# Patient Record
Sex: FEMALE | URBAN - METROPOLITAN AREA
[De-identification: names, ages, dates, MRNs, and addresses within clinical notes are randomized per-mention and may not be internally consistent; named-entity substitution may affect disease eponyms.]

---

## 2017-11-29 ENCOUNTER — TRANSFERRED RECORDS (OUTPATIENT)
Dept: HEALTH INFORMATION MANAGEMENT | Facility: CLINIC | Age: 82
End: 2017-11-29

## 2017-11-29 ENCOUNTER — TELEPHONE (OUTPATIENT)
Dept: OPHTHALMOLOGY | Facility: CLINIC | Age: 82
End: 2017-11-29

## 2017-11-29 DIAGNOSIS — H33.21 RIGHT RETINAL DETACHMENT: Primary | ICD-10-CM

## 2017-11-29 NOTE — TELEPHONE ENCOUNTER
Dr Dinah Reed wants pt seen today for an RD. She lives 4 hours away. You can call the clinic at [phone] and/or call the pt with an apt.     Right eye retina detachment-- 20/200 vision   Pt symptomatic-- unsure, but been awhile and worsened and yesterday scared pt per patient-- detachment bubbling over macula, unsure if macula off    Last eye exam 11-20-17 with DR. Reed and no detachment then    H/o HTN, no DM  No previous Retina detachments  Pseudophakic    Will review plan of care with Dr. lucio and call back  Geovanni Cool RN 12:21 PM 11/29/17

## 2017-11-29 NOTE — TELEPHONE ENCOUNTER
----- Message from Marko Diaz sent at 11/29/2017 11:59 AM CST -----  Regarding: same day RD apt  Olamide Galarza, Dr Dinah Reed wants pt seen today for an RD. She lives 4 hours away. You can call the clinic at [phone] and/or call the pt with an apt.    Thanks stephany!    Marko

## 2017-11-29 NOTE — TELEPHONE ENCOUNTER
Reviewed plan with dr. Rawls/Lola    Pt to have pre-op today  See dr. lucio tomorrow AM  Surgery mid morning  Post op Friday  May f/u locally afterwards for post-op   Reviewed plan with pt and updated referring clinic  Notes to be coming  Pt aware of date/time/location  Geovanni Cool RN 12:54 PM 11/29/17    Note to surgery scheduler/Dr. Davila/facilitator  Geovanni Cool RN 12:55 PM 11/29/17

## 2017-11-30 ENCOUNTER — OFFICE VISIT (OUTPATIENT)
Dept: OPHTHALMOLOGY | Facility: CLINIC | Age: 82
End: 2017-11-30
Attending: OPHTHALMOLOGY
Payer: MEDICARE

## 2017-11-30 ENCOUNTER — TRANSFERRED RECORDS (OUTPATIENT)
Dept: HEALTH INFORMATION MANAGEMENT | Facility: CLINIC | Age: 82
End: 2017-11-30

## 2017-11-30 DIAGNOSIS — H33.21 RIGHT RETINAL DETACHMENT: Primary | ICD-10-CM

## 2017-11-30 PROCEDURE — 99213 OFFICE O/P EST LOW 20 MIN: CPT | Mod: ZF

## 2017-11-30 RX ORDER — MAGNESIUM GLUCONATE 27 MG(500)
1000 TABLET ORAL
COMMUNITY

## 2017-11-30 RX ORDER — ASPIRIN 325 MG
325 TABLET ORAL
COMMUNITY

## 2017-11-30 RX ORDER — ACETAMINOPHEN 500 MG
500 TABLET ORAL
COMMUNITY

## 2017-11-30 RX ORDER — AMLODIPINE BESYLATE 5 MG/1
5 TABLET ORAL
COMMUNITY
Start: 2017-06-23

## 2017-11-30 RX ORDER — MULTIVIT-MIN/IRON/FOLIC ACID/K 18-600-40
1 CAPSULE ORAL
COMMUNITY

## 2017-11-30 RX ORDER — VITAMIN E 268 MG
1000 CAPSULE ORAL
COMMUNITY

## 2017-11-30 RX ORDER — ACETAMINOPHEN 160 MG
1000 TABLET,DISINTEGRATING ORAL
COMMUNITY

## 2017-11-30 RX ORDER — VALSARTAN AND HYDROCHLOROTHIAZIDE 80; 12.5 MG/1; MG/1
1 TABLET, FILM COATED ORAL
COMMUNITY
Start: 2017-06-23

## 2017-11-30 ASSESSMENT — CONF VISUAL FIELD
OS_NORMAL: 1
OD_INFERIOR_TEMPORAL_RESTRICTION: 1
METHOD: COUNTING FINGERS
OD_SUPERIOR_TEMPORAL_RESTRICTION: 3
OD_INFERIOR_NASAL_RESTRICTION: 1
OD_SUPERIOR_NASAL_RESTRICTION: 1

## 2017-11-30 ASSESSMENT — REFRACTION_WEARINGRX
OS_ADD: +2.75
OD_SPHERE: -0.75
OS_SPHERE: -1.00
OD_AXIS: 067
SPECS_TYPE: PAL
OD_ADD: +2.75
OS_CYLINDER: +0.50
OS_AXIS: 035
OD_CYLINDER: +0.75

## 2017-11-30 ASSESSMENT — TONOMETRY
IOP_METHOD: TONOPEN
OD_IOP_MMHG: 11
OS_IOP_MMHG: 17

## 2017-11-30 ASSESSMENT — VISUAL ACUITY
CORRECTION_TYPE: GLASSES
METHOD: SNELLEN - LINEAR
OS_CC+: -2
OD_CC: 20/500
OS_CC: 20/20

## 2017-11-30 ASSESSMENT — SLIT LAMP EXAM - LIDS
COMMENTS: NORMAL
COMMENTS: NORMAL

## 2017-11-30 ASSESSMENT — EXTERNAL EXAM - RIGHT EYE: OD_EXAM: NORMAL

## 2017-11-30 ASSESSMENT — EXTERNAL EXAM - LEFT EYE: OS_EXAM: NORMAL

## 2017-11-30 ASSESSMENT — CUP TO DISC RATIO
OD_RATIO: 0.2
OS_RATIO: 0.2

## 2017-11-30 NOTE — MR AVS SNAPSHOT
After Visit Summary   2017    Moon Prakash    MRN: [id number]           Patient Information     Date Of Birth          1935        Visit Information        Provider Department      2017 7:30 AM Hayley Ruiz MD [facility]        Today's Diagnoses     Right retinal detachment    -  1       Follow-ups after your visit        Follow-up notes from your care team     Return in about 1 day (around 2017).      Your next 10 appointments already scheduled     Dec 01, 2017  8:30 AM CST   Post-Op with Hazel Rawls MD   Eye Clinic ([facility])    [address]  [address]  [address]  [facility] [address] [phone]              Who to contact     Please call your clinic at [phone] to:    Ask questions about your health    Make or cancel appointments    Discuss your medicines    Learn about your test results    Speak to your doctor   If you have compliments or concerns about an experience at your clinic, or if you wish to file a complaint, please contact [facility] Patient Relations at [phone] or email us at [email or web address]         Additional Information About Your Visit        MyChart Information     [employer] is an electronic gateway that provides easy, online access to your medical records. With [employer] you can request a clinic appointment, read your test results, renew a prescription or communicate with your care team.     To sign up for [employer] visit the website at [email or web address]   You will be asked to enter the access code listed below, as well as some personal information. Please follow the directions to create your username and password.     Your access code is: [email or web address]  Expires: 2018  6:30 AM     Your access code will  in 90 days. If you need help or a new code, please contact your [facility] or call  [phone] for assistance.        Care EveryWhere ID     This is your Care EveryWhere ID. This could be used by other organizations to access your [facility] medical records  [id number]         Blood Pressure from Last 3 Encounters:   No data found for BP    Weight from Last 3 Encounters:   No data found for Wt              We Performed the Following     Cherrie-Operative Worksheet (Retina)        Primary Care Provider Office Phone # Fax #    Nettie Gonzalez [phone]       [address] [address]        Equal Access to Services     [facility]: Hadii aad ku hadasho Soomaali, waaxda luqadaha, qaybta kaalmada adeegyada, waxay idiin hayaan adam phillips . So [facility] [phone].    ATENCIÓN: Si habla español, tiene a shaffer disposición servicios gratuitos de asistencia lingüística. [facility] [phone].    We comply with applicable federal civil rights laws and [place] laws. We do not discriminate on the basis of race, color, national origin, age, disability, sex, sexual orientation, or gender identity.            Thank you!     Thank you for choosing EYE CLINIC  for your care. Our goal is always to provide you with excellent care. Hearing back from our patients is one way we can continue to improve our services. Please take a few minutes to complete the written survey that you may receive in the mail after your visit with us. Thank you!             Your Updated Medication List - Protect others around you: Learn how to safely use, store and throw away your medicines at www.disposemymeds.org.          This list is accurate as of: 11/30/17  8:47 AM.  Always use your most recent med list.                   Brand Name Dispense Instructions for use Diagnosis    acetaminophen 500 MG tablet    TYLENOL     Take 500 mg by mouth        amLODIPine 5 MG tablet    NORVASC     Take 5 mg by mouth        aspirin 325 MG tablet      Take 325 mg by mouth         coenzyme Q-10 10 MG Caps      Take 1 tablet by mouth        conjugated estrogens cream    PREMARIN     Place 1 g vaginally        FISH OIL + D3 PO      Take 1,000 mg by mouth        flaxseed oil 1000 MG Caps      Take 1,000 mg by mouth        Garlic 1000 MG Caps      Take 1 capsule by mouth        magnesium gluconate 500 MG tablet    MAGONATE     Take 1,000 mg by mouth        Octacosanol 1000-5 MCG-UNIT Tabs      Take 1 tablet by mouth        valsartan-hydrochlorothiazide 80-12.5 MG per tablet    DIOVAN-HCT     Take 1 tablet by mouth        Vitamin B 12 100 MCG Lozg      Take 1 tablet by mouth        Vitamin C 500 MG Caps      Take 1 tablet by mouth        vitamin D3 2000 UNITS Caps      Take 1,000 Units by mouth

## 2017-11-30 NOTE — LETTER
11/30/2017       RE: Moon Prakash  [address]  [place] [address]     Dear Colleague,    Thank you for referring your patient, Moon Prakash, to the EYE CLINIC at [facility]. Please see a copy of my visit note below.       CC: flashes and floaters right eye   HPI: Moon Prakash is a  82 year old year-old patient referred by Dr Dinah Reed, optometrist, for evaluation and treat of a possible Retinal detachment  Right eye. Patient reports 10 days ago noted change in vision in right eye where nasal visual field appeared blurred. She thought this was due to secondary cataract (pco) development, however over thanksgiving weekend nasal field became opaque and this area seemed to be expanding. Patient was seen by outside optometrist diagnosed with retinal detachment. NPO since 11 pm.     Medical history:   High myopia  Pseudophakai both eyes     Family history:   Retinal detachment- mother      Assessment & Plan:     1. Rhematogenous Retinal Detachment right eye   Temporal retinal detachment with hole identified at 8 oclock. Macula off right eye     I reviewed the indications, risks, benefits, and alternatives of the proposed surgical procedure including, but not limited to, failure to improve vision or further loss of vision,  and need for additional surgery, bleeding, infection, loss of vision and the remote possibility of complications of anesthesia. 1:1000 risk of infection/bleed/loss of eye; 1:100 risk of RD and need for further surgery. Patient aware of prolonged healing after retinal surgery (up to a year after surgery) as well as possibility of a gas/SO  instillation into eye. Patient agreed to proceed with surgery.  I provided multiple opportunities for the questions, answered all questions to the best of my ability, and confirmed that my answers and my discussion were understood.     Plan:   Plan for surgical repair today   Return to clinic 1 day for post op  care     Rosana Duenas MD  Ophthalmology PGY3    ~~~~~~~~~~~~~~~~~~~~~~~~~~~~~~~~~~   Complete documentation of historical and exam elements from today's encounter can be found in the full encounter summary report (not reduplicated in this progress note).  I personally obtained the chief complaint(s) and history of present illness.  I confirmed and edited as necessary the review of systems, past medical/surgical history, family history, social history, and examination findings as documented by others; and I examined the patient myself.  I personally reviewed the relevant tests, images, and reports as documented above.  I personally reviewed the ophthalmic test(s) associated with this encounter, agree with the interpretation(s) as documented by the resident/fellow, and have edited the corresponding report(s) as necessary.   I formulated and edited as necessary the assessment and plan and discussed the findings and management plan with the patient and family    Hayley Ruiz MD  .  Retina Service   Department of Ophthalmology and Visual Neurosciences   [facility]  Phone: [phone]   Fax: [phone]

## 2017-11-30 NOTE — PROGRESS NOTES
CC: flashes and floaters right eye   HPI: Moon Prakash is a  82 year old year-old patient referred by Dr Dinah Reed, optometrist, for evaluation and treat of a possible Retinal detachment  Right eye. Patient reports 10 days ago noted change in vision in right eye where nasal visual field appeared blurred. She thought this was due to secondary cataract (pco) development, however over thanksgiving weekend nasal field became opaque and this area seemed to be expanding. Patient was seen by outside optometrist diagnosed with retinal detachment. NPO since 11 pm.     Medical history:   High myopia  Pseudophakai both eyes     Family history:   Retinal detachment- mother      Assessment & Plan:     1. Rhematogenous Retinal Detachment right eye   Temporal retinal detachment with hole identified at 8 oclock. Macula off right eye     I reviewed the indications, risks, benefits, and alternatives of the proposed surgical procedure including, but not limited to, failure to improve vision or further loss of vision,  and need for additional surgery, bleeding, infection, loss of vision and the remote possibility of complications of anesthesia. 1:1000 risk of infection/bleed/loss of eye; 1:100 risk of RD and need for further surgery. Patient aware of prolonged healing after retinal surgery (up to a year after surgery) as well as possibility of a gas/SO  instillation into eye. Patient agreed to proceed with surgery.  I provided multiple opportunities for the questions, answered all questions to the best of my ability, and confirmed that my answers and my discussion were understood.     Plan:   Plan for surgical repair today   Return to clinic 1 day for post op care     Rosana Duenas MD  Ophthalmology PGY3    ~~~~~~~~~~~~~~~~~~~~~~~~~~~~~~~~~~   Complete documentation of historical and exam elements from today's encounter can be found in the full encounter summary report (not reduplicated in this progress note).  I personally  obtained the chief complaint(s) and history of present illness.  I confirmed and edited as necessary the review of systems, past medical/surgical history, family history, social history, and examination findings as documented by others; and I examined the patient myself.  I personally reviewed the relevant tests, images, and reports as documented above.  I personally reviewed the ophthalmic test(s) associated with this encounter, agree with the interpretation(s) as documented by the resident/fellow, and have edited the corresponding report(s) as necessary.   I formulated and edited as necessary the assessment and plan and discussed the findings and management plan with the patient and family    Hayley Ruiz MD  .  Retina Service   Department of Ophthalmology and Visual Neurosciences   [facility]  Phone: [phone]   Fax: [phone]

## 2017-11-30 NOTE — NURSING NOTE
Chief Complaints and History of Present Illnesses   Patient presents with     Retinal Detachment Evaluation     Right Eye     HPI    Affected eye(s):  Right   Symptoms:     No redness   No tearing   No Dryness   No itching         Do you have eye pain now?:  No      Comments:  Pt states increase in flashers and floaters on Monday of this week. No flashes seen currently. No eye pain today.     Puneet SALDIVAR November 30, 2017 7:20 AM

## 2017-12-01 ENCOUNTER — OFFICE VISIT (OUTPATIENT)
Dept: OPHTHALMOLOGY | Facility: CLINIC | Age: 82
End: 2017-12-01
Attending: OPHTHALMOLOGY
Payer: MEDICARE

## 2017-12-01 DIAGNOSIS — Z98.890 POST-OPERATIVE STATE: ICD-10-CM

## 2017-12-01 DIAGNOSIS — H33.21 RIGHT RETINAL DETACHMENT: Primary | ICD-10-CM

## 2017-12-01 PROCEDURE — 99213 OFFICE O/P EST LOW 20 MIN: CPT | Mod: ZF

## 2017-12-01 ASSESSMENT — SLIT LAMP EXAM - LIDS
COMMENTS: NORMAL
COMMENTS: NORMAL

## 2017-12-01 ASSESSMENT — EXTERNAL EXAM - LEFT EYE: OS_EXAM: NORMAL

## 2017-12-01 ASSESSMENT — VISUAL ACUITY
OD_SC: HM
OS_PH_SC: 20/25-1
METHOD: SNELLEN - LINEAR
OS_SC: 20/50

## 2017-12-01 ASSESSMENT — TONOMETRY
OS_IOP_MMHG: 13
OD_IOP_MMHG: 18
IOP_METHOD: ICARE

## 2017-12-01 ASSESSMENT — EXTERNAL EXAM - RIGHT EYE: OD_EXAM: NORMAL

## 2017-12-01 ASSESSMENT — CUP TO DISC RATIO: OD_RATIO: 0.2

## 2017-12-01 NOTE — PATIENT INSTRUCTIONS
POST-OPERATIVE INSTRUCTIONS FOLLOWING RETINA SURGERY    Hazel Rawls MD, PhD  Department of Ophthalmology  [facility]  [phone]      ACTIVITY:    No heavy lifting for 2 weeks after surgery.    No swimming for 3 weeks after surgery.    It is OK for you to shower, to wash your face, and to wash your hair.  Allow the shower to hit the top of your head and wash down your face.  Do not hit your eye directly with the jet from the shower.    Keep your eye covered with the shield when you sleep for 1 week after surgery.  If your shield has a tab, it is designed to go over the bridge of your nose.  Place one piece of tape diagonally from the center of your forehead to the side of your cheek to secure the shield.     During the daytime, you can use either the shield or your regular eyeglasses or sunglasses to protect your eye.    GAS BUBBLE POSITIONING REQUIREMENTS  Positioning requirements will be discussed with you if you have an oil or gas bubble in your eye:    Position:    Face Down daytime & sleep left side down (rolled towards face)  for 4 days  After 4 days you can be either face down or left side down during daytime and sleep left side down    Maintain positioning for 10 days.      When positioning, it is OK to take brief breaks to eat, stretch, clean up, etc.  Try to position for 90% of the time (5 minute break per hour on average).    Do not lay flat on your back until the bubble is gone.    Do not fly on an airplane or travel to elevations more than 1000 feet above [place] until the bubble is gone.    Keep the green bracelet on your wrist until the bubble is gone.  If it breaks, we can give you a replacement      EYE DROPS  Use these drops after surgery.  When using more than one drop, separate them by 3 minutes between drops.  Common times to place drops are breakfast, lunch, dinner, and bedtime.  Do not stop your drops without discussing with our office.  If you run out before your  appointment, call and we will send in a refill.      Atropine ---  2 times per day  Ofloxacin --- 4 times per day  Pred Forte (prednisolone acetate) --- 4 times per day    WHAT TO EXPECT  It is common for the eye to to have a blood tinged discharge for a few days after surgery  It may feel irritated (as if something were in your eye), for there to be clear discharge (thicker in the mornings upon awakening), and for it to be bloodshot for 2-3 weeks following retina surgery.   Your vision is also commonly decreased during this time due to the bubble.          WHAT TO WATCH OUT FOR  If you experience any of the following, you should call immediately:    Increasing pain    Increasing nausea or vomiting    Increasing redness    Worsening or darkening of the vision    New flashing lights or floaters      For any of the symptoms listed above, or for other concerns, call [phone] and ask to speak to the clinic nurse.  If you call after hours, follow to prompts to reach the doctor on call.

## 2017-12-01 NOTE — NURSING NOTE
Chief Complaints and History of Present Illnesses   Patient presents with     Follow Up For     Retinal repair RE 11/30/2017     HPI    Affected eye(s):  Right   Symptoms:     Blurred vision   Decreased vision         Do you have eye pain now?:  No      Comments:  Aruna Altamirano COA 8:30 AM December 1, 2017

## 2017-12-01 NOTE — LETTER
12/1/2017       RE: Moon Prakash  [address]  [place] [address]     Dear Colleague,    Thank you for referring your patient, Moon Prakash, to the EYE CLINIC at [facility]. Please see a copy of my visit note below.    CC -   Post op OD RD repair     INTERVAL HISTORY - POD #1 OD s/p RD repair PPV/FGx/EL.   Patient reports did not sleep well, due to face down positioning. However has maintained face down positioning. No eye pain. Vision     HPI -  referred by Dr Dinah Reed, optometrist, for evaluation and treat of a possible Retinal detachment  Right eye. Patient reports 10 days prior (~11/19/17)  noted change in vision in right eye where nasal visual field appeared blurred. She thought this was due to secondary cataract (pco) development, however over thanksgiving weekend nasal field became opaque and this area seemed to be expanding.      PAST OCULAR SURGERY  CE/IOL OU ~ 2005 per patient  PPV/FGx C3F8/EL OD 11/30/17    RETINAL IMAGING  None today      ASSESSMENT & PLAN    1.  POD #1  OD   - s/p PPV/FGx C3F8/EL 11/30/17   - IOP good, retina flat, no infection     - PF/ofloxacin/AT gtts   - face down daytime & sleep LHS down x 4 days   - then LHS down vs face down daytime, sleep LHS down for total 7-10 days     - f/u 1 week in [place]      2.  Vitreous syneresis OS    return to clinic: 1 week for post op care in [place]    Rosana Duenas MD  Ophthalmology PGY3      ATTESTATION     Attending Attestation:     Complete documentation of historical and exam elements from today's encounter can be found in the full encounter summary report (not reduplicated in this progress note).  I personally obtained the chief complaint(s) and history of present illness.  I confirmed and edited as necessary the review of systems, past medical/surgical history, family history, social history, and examination findings as documented by others; and I examined the patient myself.  I personally reviewed the relevant  tests, images, and reports as documented above.  I formulated and edited as necessary the assessment and plan and discussed the findings and management plan with the patient and family    Hazel Rawls MD, PhD  , Vitreoretinal Surgery  Department of Ophthalmology  [facility]        Base Eye Exam     Visual Acuity (Snellen - Linear)      Right Left   Dist sc HM 20/50   Dist ph sc  20/25-1         Tonometry ([facility], 8:35 AM)      Right Left   Pressure 18 13         Neuro/Psych     Oriented x3:  Yes    Mood/Affect:  Normal      Dilation     Both eyes:  already dilated @ 8:34 AM            Slit Lamp and Fundus Exam     External Exam      Right Left    External Normal Normal      Slit Lamp Exam      Right Left    Lids/Lashes Normal Normal    Conjunctiva/Sclera injection 1-2+ diffule, trochar wounds temporal and nasal well sealed White and quiet    Cornea Small heme on endothelium Clear    Anterior Chamber Deep and quiet Deep and quiet    Iris dilated Round and reactive    Lens pciol trace peripheral pco  pciol    Vitreous 85% [id number]  not dilated      Fundus Exam      Right Left    Disc Normal     C/D Ratio 0.2     Macula flat     Vessels Normal     Periphery flat with pexy on hole at 12:00 and 8:00, pexy 180 degrees temporal                 Again, thank you for allowing me to participate in the care of your patient.      Sincerely,    Hazel Rawls MD

## 2017-12-01 NOTE — PROGRESS NOTES
CC -   Post op OD RD repair     INTERVAL HISTORY - POD #1 OD s/p RD repair PPV/FGx/EL.   Patient reports did not sleep well, due to face down positioning. However has maintained face down positioning. No eye pain. Vision     HPI -  referred by Dr Dinah Reed, optometrist, for evaluation and treat of a possible Retinal detachment  Right eye. Patient reports 10 days prior (~11/19/17)  noted change in vision in right eye where nasal visual field appeared blurred. She thought this was due to secondary cataract (pco) development, however over thanksgiving weekend nasal field became opaque and this area seemed to be expanding.      PAST OCULAR SURGERY  CE/IOL OU ~ 2005 per patient  PPV/FGx C3F8/EL OD 11/30/17    RETINAL IMAGING  None today      ASSESSMENT & PLAN    1.  POD #1  OD   - s/p PPV/FGx C3F8/EL 11/30/17   - IOP good, retina flat, no infection     - PF/ofloxacin/AT gtts   - face down daytime & sleep LHS down x 4 days   - then LHS down vs face down daytime, sleep LHS down for total 7-10 days     - f/u 1 week in [place]      2.  Vitreous syneresis OS    return to clinic: 1 week for post op care in [place]    Rosana Duenas MD  Ophthalmology PGY3      ATTESTATION     Attending Attestation:     Complete documentation of historical and exam elements from today's encounter can be found in the full encounter summary report (not reduplicated in this progress note).  I personally obtained the chief complaint(s) and history of present illness.  I confirmed and edited as necessary the review of systems, past medical/surgical history, family history, social history, and examination findings as documented by others; and I examined the patient myself.  I personally reviewed the relevant tests, images, and reports as documented above.  I formulated and edited as necessary the assessment and plan and discussed the findings and management plan with the patient and family    Hazel Rawls MD, PhD  , Vitreoretinal  Surgery  Department of Ophthalmology  [facility]

## 2017-12-01 NOTE — MR AVS SNAPSHOT
After Visit Summary   12/1/2017    Moon Prakash    MRN: [id number]           Patient Information     Date Of Birth          1935        Visit Information        Provider Department      12/1/2017 8:30 AM [facility]el [facility]ls [facility]        Today's Diagnoses     Right retinal detachment    -  1    Post-operative state          Care Instructions    POST-OPERATIVE INSTRUCTIONS FOLLOWING RETINA SURGERY    Hazel Rawls MD, PhD  Department of Ophthalmology  [facility]  [phone]      ACTIVITY:    No heavy lifting for 2 weeks after surgery.    No swimming for 3 weeks after surgery.    It is OK for you to shower, to wash your face, and to wash your hair.  Allow the shower to hit the top of your head and wash down your face.  Do not hit your eye directly with the jet from the shower.    Keep your eye covered with the shield when you sleep for 1 week after surgery.  If your shield has a tab, it is designed to go over the bridge of your nose.  Place one piece of tape diagonally from the center of your forehead to the side of your cheek to secure the shield.     During the daytime, you can use either the shield or your regular eyeglasses or sunglasses to protect your eye.    GAS BUBBLE POSITIONING REQUIREMENTS  Positioning requirements will be discussed with you if you have an oil or gas bubble in your eye:    Position:    Face Down daytime & sleep left side down (rolled towards face)  for 4 days  After 4 days you can be either face down or left side down during daytime and sleep left side down    Maintain positioning for 10 days.      When positioning, it is OK to take brief breaks to eat, stretch, clean up, etc.  Try to position for 90% of the time (5 minute break per hour on average).    Do not lay flat on your back until the bubble is gone.    Do not fly on an airplane or travel to elevations more than 1000 feet above [place] until the bubble is gone.    Keep the  green bracelet on your wrist until the bubble is gone.  If it breaks, we can give you a replacement      EYE DROPS  Use these drops after surgery.  When using more than one drop, separate them by 3 minutes between drops.  Common times to place drops are breakfast, lunch, dinner, and bedtime.  Do not stop your drops without discussing with our office.  If you run out before your appointment, call and we will send in a refill.      Atropine ---  2 times per day  Ofloxacin --- 4 times per day  Pred Forte (prednisolone acetate) --- 4 times per day    WHAT TO EXPECT  It is common for the eye to to have a blood tinged discharge for a few days after surgery  It may feel irritated (as if something were in your eye), for there to be clear discharge (thicker in the mornings upon awakening), and for it to be bloodshot for 2-3 weeks following retina surgery.   Your vision is also commonly decreased during this time due to the bubble.          WHAT TO WATCH OUT FOR  If you experience any of the following, you should call immediately:    Increasing pain    Increasing nausea or vomiting    Increasing redness    Worsening or darkening of the vision    New flashing lights or floaters      For any of the symptoms listed above, or for other concerns, call [phone] and ask to speak to the clinic nurse.  If you call after hours, follow to prompts to reach the doctor on call.                    Follow-ups after your visit        Follow-up notes from your care team     Return in about 1 week (around 12/8/2017).      Who to contact     Please call your clinic at [phone] to:    Ask questions about your health    Make or cancel appointments    Discuss your medicines    Learn about your test results    Speak to your doctor   If you have compliments or concerns about an experience at your clinic, or if you wish to file a complaint, please contact [facility] Patient Relations at [phone] or email us at  [email or web address]         Additional Information About Your Visit        Paxatahart Information     [employer] is an electronic gateway that provides easy, online access to your medical records. With [employer], you can request a clinic appointment, read your test results, renew a prescription or communicate with your care team.     To sign up for [employer] visit the website at [email or web address]   You will be asked to enter the access code listed below, as well as some personal information. Please follow the directions to create your username and password.     Your access code is: [email or web address]  Expires: 2018  6:30 AM     Your access code will  in 90 days. If you need help or a new code, please contact your [facility] or call [phone] for assistance.        Care EveryWhere ID     This is your Care EveryWhere ID. This could be used by other organizations to access your [place] medical records  [id number]         Blood Pressure from Last 3 Encounters:   No data found for BP    Weight from Last 3 Encounters:   No data found for Wt              Today, you had the following     No orders found for display       Primary Care Provider Office Phone # Fax #    Nettie Gonzalez,  [phone]       [place] [address]        Equal Access to Services     SHELLY GRIMES : Hadii aad ku hadasho Soomaali, waaxda luqadaha, qaybta kaalmada adeegyada, waxay idiin hayaan adam phillips . So [facility] [phone].    ATENCIÓN: Si habla español, tiene a shaffer disposición servicios gratuitos de asistencia lingüística. Llame al [phone].    We comply with applicable federal civil rights laws and [place] laws. We do not discriminate on the basis of race, color, national origin, age, disability, sex, sexual orientation, or gender identity.            Thank you!     Thank you for choosing EYE CLINIC  for your  care. Our goal is always to provide you with excellent care. Hearing back from our patients is one way we can continue to improve our services. Please take a few minutes to complete the written survey that you may receive in the mail after your visit with us. Thank you!             Your Updated Medication List - Protect others around you: Learn how to safely use, store and throw away your medicines at www.disposemymeds.org.          This list is accurate as of: 12/1/17 10:15 AM.  Always use your most recent med list.                   Brand Name Dispense Instructions for use Diagnosis    acetaminophen 500 MG tablet    TYLENOL     Take 500 mg by mouth        amLODIPine 5 MG tablet    NORVASC     Take 5 mg by mouth        aspirin 325 MG tablet      Take 325 mg by mouth        coenzyme Q-10 10 MG Caps      Take 1 tablet by mouth        conjugated estrogens cream    PREMARIN     Place 1 g vaginally        FISH OIL + D3 PO      Take 1,000 mg by mouth        flaxseed oil 1000 MG Caps      Take 1,000 mg by mouth        Garlic 1000 MG Caps      Take 1 capsule by mouth        magnesium gluconate 500 MG tablet    MAGONATE     Take 1,000 mg by mouth        Octacosanol 1000-5 MCG-UNIT Tabs      Take 1 tablet by mouth        valsartan-hydrochlorothiazide 80-12.5 MG per tablet    DIOVAN-HCT     Take 1 tablet by mouth        Vitamin B 12 100 MCG Lozg      Take 1 tablet by mouth        Vitamin C 500 MG Caps      Take 1 tablet by mouth        vitamin D3 2000 UNITS Caps      Take 1,000 Units by mouth

## 2017-12-06 ENCOUNTER — TELEPHONE (OUTPATIENT)
Dept: OPHTHALMOLOGY | Facility: CLINIC | Age: 82
End: 2017-12-06

## 2017-12-06 NOTE — TELEPHONE ENCOUNTER
Dr. Vidal at [facility] (referring clinic) saw pt today after fall  States retina attached and no concerning findings-- some increase in secondary cataract, some post op anterior pigment note    Head injury not severe-- injury mostly to elbow per clinic evaluation      Reviewed with dr. Davila-- pt may sit up 50% of time during day and continue laying on left side at night  Reviewed ok to nap    Pt's follow with retina MD next week  Reviewed may discontinue ocuflox after one week of use (tomorrow last day)  Reviewed may reduce atropine to daily until visit with retina MD next week  Pt to continue prednisolone 4/day until retina visit next week locally    Note to dr. soledad oCol RN 4:55 PM 12/06/17

## 2017-12-06 NOTE — TELEPHONE ENCOUNTER
----- Message from Bernardo Martinez sent at 12/6/2017 11:58 AM CST -----  Regarding: Pt Fell and Hit Head  Contact: [phone]  Pt of Dr. 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    Pt fell last night, and hit her head. Hasn't noticed any changes in her vision, though thinks she may have have small blockage in her line of sight in her Right eye, towards the top/center of her eye.     I tried calling, but couldn't reach anyone.    Pt also would like to discuss instructions before treating w/ DrRuddy [place].     Was instructed to keep her eye parallel to the floor at all times. Was supposed to see  On Friday, 12/8, however the  Moved their appt to Tuesday, 12/12.     Pt stated this has taken a toll on her neck, and back, and is unsure if she can make it that long. Pt would like to know if this is something she can stop doing.    Please call pt at [phone] to discuss options, and what she should do regarding having fallen, and hit her head.    Thank you,  Aaron  Call Center    Please DO NOT send message and or reply back to sender. Call center Representatives DO NOT respond to Messages.

## 2017-12-06 NOTE — TELEPHONE ENCOUNTER
Pt verbally stated would go have evaluation today for head injury  Geovanni Cool RN 12:31 PM 12/06/17

## 2017-12-06 NOTE — TELEPHONE ENCOUNTER
Pt woke up in night and fell  States hit her head and elbow  No loss of consciousness, but did have headache which has now resolved  S/p retina detachment repair 11-29-17  Pt states darker area superior, but unsure if was there before  Retina specialist not available til next week in [place]    Reviewed should see ED today for f/u of head injury/fall-- make sure no bleed.  Pt to f/u with referring clinic also-- at same location-- afterwards if able    Pt has questions on positioning-- currently face down and on side at night, will review with MD if able to make any changes    Note to dr. Davila for review  Geovanni Cool RN 12:30 PM 12/06/17

## 2018-02-26 ENCOUNTER — TRANSFERRED RECORDS (OUTPATIENT)
Dept: HEALTH INFORMATION MANAGEMENT | Facility: CLINIC | Age: 83
End: 2018-02-26

## 2018-02-27 ENCOUNTER — OFFICE VISIT (OUTPATIENT)
Dept: OPHTHALMOLOGY | Facility: CLINIC | Age: 83
End: 2018-02-27
Attending: OPHTHALMOLOGY
Payer: MEDICARE

## 2018-02-27 DIAGNOSIS — H33.21 RIGHT RETINAL DETACHMENT: Primary | ICD-10-CM

## 2018-02-27 PROCEDURE — G0463 HOSPITAL OUTPT CLINIC VISIT: HCPCS | Mod: ZF

## 2018-02-27 ASSESSMENT — EXTERNAL EXAM - RIGHT EYE: OD_EXAM: NORMAL

## 2018-02-27 ASSESSMENT — SLIT LAMP EXAM - LIDS
COMMENTS: NORMAL
COMMENTS: NORMAL

## 2018-02-27 ASSESSMENT — VISUAL ACUITY
OD_CC: 20/50
OD_CC+: -2
METHOD: SNELLEN - LINEAR
OD_PH_CC: 20/30
CORRECTION_TYPE: GLASSES
OS_CC: 20/25

## 2018-02-27 ASSESSMENT — TONOMETRY
OD_IOP_MMHG: 16
OS_IOP_MMHG: 16
IOP_METHOD: TONOPEN

## 2018-02-27 ASSESSMENT — EXTERNAL EXAM - LEFT EYE: OS_EXAM: NORMAL

## 2018-02-27 ASSESSMENT — CUP TO DISC RATIO
OS_RATIO: 0.2
OD_RATIO: 0.2

## 2018-02-27 ASSESSMENT — CONF VISUAL FIELD
OS_NORMAL: 1
OD_SUPERIOR_TEMPORAL_RESTRICTION: 1

## 2018-02-27 NOTE — NURSING NOTE
Chief Complaints and History of Present Illnesses   Patient presents with     Post Op (Ophthalmology) Right Eye     s/p RD repair PPV/FGx/EL RE     HPI    Affected eye(s):  Right   Symptoms:        Duration:  3 months   Frequency:  Constant       Do you have eye pain now?:  No      Comments:  Pt. States that she started noticing shadows in periphery RE last week.  No flashes or floaters BE.  Shazia Graff COT 1:01 PM February 27, 2018

## 2018-02-27 NOTE — PROGRESS NOTES
CC -   NEW RD OD      INTERVAL HISTORY -Pt states started seeing shadows in va right eye peripherally last week that haven't changed or interfere with driving. POM#3 OD s/p RD repair PPV/FGx/EL.    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  Vitreous syneresis OS    return to clinic: post-op follow up    Jose Gipson MD  Ophthalmology, PGY-3    ATTESTATION     Attending Physician Attestation:      Complete documentation of historical and exam elements from today's encounter can be found in the full encounter summary report (not reduplicated in this progress note).  I personally obtained the chief complaint(s) and history of present illness.  I confirmed and edited as necessary the review of systems, past medical/surgical history, family history, social history, and examination findings as documented by others; and I examined the patient myself.  I personally reviewed the relevant tests, images, and reports as documented above.  I formulated and edited as necessary the assessment and plan and discussed the findings and management plan with the patient and family    Hazel Rawls MD, PhD  , Vitreoretinal Surgery  Department of Ophthalmology  [facility]

## 2018-02-27 NOTE — MR AVS SNAPSHOT
After Visit Summary   2018    Moon Prakash    MRN: [id number]           Patient Information     Date Of Birth          1935        Visit Information        Provider Department      2018 1:00 PM [facility]        Today's Diagnoses     Right retinal detachment    -  1       Follow-ups after your visit        Who to contact     Please call your clinic at [phone] to:    Ask questions about your health    Make or cancel appointments    Discuss your medicines    Learn about your test results    Speak to your doctor            Additional Information About Your Visit        MyChart Information     [employer] is an electronic gateway that provides easy, online access to your medical records. With [employer], you can request a clinic appointment, read your test results, renew a prescription or communicate with your care team.     To sign up for [employer] visit the website at [email or web address]   You will be asked to enter the access code listed below, as well as some personal information. Please follow the directions to create your username and password.     Your access code is: [email or web address]  Expires: 2018  6:30 AM     Your access code will  in 90 days. If you need help or a new code, please contact your [facility] or call [phone] for assistance.        Care EveryWhere ID     This is your Care EveryWhere ID. This could be used by other organizations to access your [place] medical records  [id number]         Blood Pressure from Last 3 Encounters:   No data found for BP    Weight from Last 3 Encounters:   No data found for Wt              We Performed the Following     Cherrie-Operative Worksheet (Retina)        Primary Care Provider Office Phone # Fax #    Nettie Gonzalez [phone]       [place] [id number] [facility] [address]        Equal Access to Services      SHELLY [facility]: Hadii aad ku marimar Guy, waaxda luqadaha, qaybta kaalmada adekatie, liset carlito nubiagemini medina rovertobrittany phillips . So [facility] [phone].    ATENCIÓN: Si habla trae, tiene a shaffer disposición servicios gratuitos de asistencia lingüística. Llame al [phone].    We comply with applicable federal civil rights laws and [place] laws. We do not discriminate on the basis of race, color, national origin, age, disability, sex, sexual orientation, or gender identity.            Thank you!     Thank you for choosing EYE CLINIC  for your care. Our goal is always to provide you with excellent care. Hearing back from our patients is one way we can continue to improve our services. Please take a few minutes to complete the written survey that you may receive in the mail after your visit with us. Thank you!             Your Updated Medication List - Protect others around you: Learn how to safely use, store and throw away your medicines at www.disposemymeds.org.          This list is accurate as of 2/27/18  3:05 PM.  Always use your most recent med list.                   Brand Name Dispense Instructions for use Diagnosis    acetaminophen 500 MG tablet    TYLENOL     Take 500 mg by mouth        amLODIPine 5 MG tablet    NORVASC     Take 5 mg by mouth        aspirin 325 MG tablet      Take 325 mg by mouth        coenzyme Q-10 10 MG Caps      Take 1 tablet by mouth        conjugated estrogens cream    PREMARIN     Place 1 g vaginally        FISH OIL + D3 PO      Take 1,000 mg by mouth        flaxseed oil 1000 MG Caps      Take 1,000 mg by mouth        Garlic 1000 MG Caps      Take 1 capsule by mouth        magnesium gluconate 500 MG tablet    MAGONATE     Take 1,000 mg by mouth        Octacosanol 1000-5 MCG-UNIT Tabs      Take 1 tablet by mouth        valsartan-hydrochlorothiazide 80-12.5 MG per tablet    DIOVAN-HCT     Take 1 tablet by mouth        Vitamin B 12 100 MCG Lozg      Take 1 tablet by mouth         Vitamin C 500 MG Caps      Take 1 tablet by mouth        vitamin D3 2000 UNITS Caps      Take 1,000 Units by mouth

## 2018-02-28 ENCOUNTER — TRANSFERRED RECORDS (OUTPATIENT)
Dept: HEALTH INFORMATION MANAGEMENT | Facility: CLINIC | Age: 83
End: 2018-02-28

## 2018-03-01 ENCOUNTER — OFFICE VISIT (OUTPATIENT)
Dept: OPHTHALMOLOGY | Facility: CLINIC | Age: 83
End: 2018-03-01
Attending: OPHTHALMOLOGY
Payer: MEDICARE

## 2018-03-01 DIAGNOSIS — Z48.810 AFTERCARE FOLLOWING SURGERY OF A SENSORY ORGAN: Primary | ICD-10-CM

## 2018-03-01 PROCEDURE — G0463 HOSPITAL OUTPT CLINIC VISIT: HCPCS | Mod: ZF

## 2018-03-01 ASSESSMENT — TONOMETRY
IOP_METHOD: TONOPEN
OS_IOP_MMHG: 18
OD_IOP_MMHG: 23

## 2018-03-01 ASSESSMENT — VISUAL ACUITY
OS_SC+: -2
METHOD: SNELLEN - LINEAR
OD_SC: HM
OS_SC: 20/40

## 2018-03-01 NOTE — PROGRESS NOTES
CC: POD #1 s/p right RD repair. Prior repair on 11/30/17 in right eye.     HPI: Reports sleeping poorly. Mild right eye pain.     Assessment and Plan:    Retinal detachment, right eye:   -POD #1 s/p 25 G PPV, SB, AFx, gas bubble right eye    -IOP 23, monitor as no h/o glaucoma   -Retina attached. Doing well.    -Start drops as prescribed: vigamox four times daily right eye, atropine once daily; predforte four times daily right eye    -Position face down for 1 week    Return to clinic in 1 week    Jose Gipson MD  Ophthalmology, PGY-3     ~~~~~~~~~~~~~~~~~~~~~~~~~~~~~~~~~~   Complete documentation of historical and exam elements from today's encounter can be found in the full encounter summary report (not reduplicated in this progress note).  I personally obtained the chief complaint(s) and history of present illness.  I confirmed and edited as necessary the review of systems, past medical/surgical history, family history, social history, and examination findings as documented by others; and I examined the patient myself.  I personally reviewed the relevant tests, images, and reports as documented above.  I personally reviewed the ophthalmic test(s) associated with this encounter, agree with the interpretation(s) as documented by the resident/fellow, and have edited the corresponding report(s) as necessary.   I formulated and edited as necessary the assessment and plan and discussed the findings and management plan with the patient and family    Hayley Ruiz MD  .  Retina Service   Department of Ophthalmology and Visual Neurosciences   [facility]  Phone: [phone]   Fax: [phone]

## 2018-03-01 NOTE — MR AVS SNAPSHOT
After Visit Summary   3/1/2018    Moon Prakash    MRN: [id number]           Patient Information     Date Of Birth          1935        Visit Information        Provider Department      3/1/2018 11:00 AM Hayley Ruiz MD [facility]        Today's Diagnoses     Aftercare following surgery of a sensory organ    -  1       Follow-ups after your visit        Follow-up notes from your care team     Return in about 1 week (around 3/8/2018).      Who to contact     Please call your clinic at [phone] to:    Ask questions about your health    Make or cancel appointments    Discuss your medicines    Learn about your test results    Speak to your doctor            Additional Information About Your Visit        MyChart Information     [employer] is an electronic gateway that provides easy, online access to your medical records. With Droid system master, you can request a clinic appointment, read your test results, renew a prescription or communicate with your care team.     To sign up for [employer] visit the website at [email or web address]   You will be asked to enter the access code listed below, as well as some personal information. Please follow the directions to create your username and password.     Your access code is: [email or web address]  Expires: 2018 12:40 PM     Your access code will  in 90 days. If you need help or a new code, please contact your [facility] or call [phone] for assistance.        Care EveryWhere ID     This is your Care EveryWhere ID. This could be used by other organizations to access your [place] medical records  [id number]         Blood Pressure from Last 3 Encounters:   No data found for BP    Weight from Last 3 Encounters:   No data found for Wt              Today, you had the following     No orders found for display       Primary Care Provider Office Phone # Fax #    Nettie Gonzalez [phone]       [place]  [facility] [address] [facility] [address]        Equal Access to Services     SHELLY SYDNEY : Hadii aad ku haddulce mariashahbaz Soshraddhaali, wajaydenda luqadaha, qaybta kaalmada adamfauziasherrie, liset esteban nubiagemini rhoadesmoncho seanphillbrittany monet. So [facility] [phone].    ATENCIÓN: Si habla español, tiene a shaffer disposición servicios gratuitos de asistencia lingüística. Bia al [phone].    We comply with applicable federal civil rights laws and [place] laws. We do not discriminate on the basis of race, color, national origin, age, disability, sex, sexual orientation, or gender identity.            Thank you!     Thank you for choosing EYE CLINIC  for your care. Our goal is always to provide you with excellent care. Hearing back from our patients is one way we can continue to improve our services. Please take a few minutes to complete the written survey that you may receive in the mail after your visit with us. Thank you!             Your Updated Medication List - Protect others around you: Learn how to safely use, store and throw away your medicines at www.disposemymeds.org.          This list is accurate as of 3/1/18 12:40 PM.  Always use your most recent med list.                   Brand Name Dispense Instructions for use Diagnosis    acetaminophen 500 MG tablet    TYLENOL     Take 500 mg by mouth        amLODIPine 5 MG tablet    NORVASC     Take 5 mg by mouth        aspirin 325 MG tablet      Take 325 mg by mouth        coenzyme Q-10 10 MG Caps      Take 1 tablet by mouth        conjugated estrogens cream    PREMARIN     Place 1 g vaginally        FISH OIL + D3 PO      Take 1,000 mg by mouth        flaxseed oil 1000 MG Caps      Take 1,000 mg by mouth        Garlic 1000 MG Caps      Take 1 capsule by mouth        magnesium gluconate 500 MG tablet    MAGONATE     Take 1,000 mg by mouth        Octacosanol 1000-5 MCG-UNIT Tabs      Take 1 tablet by mouth        valsartan-hydrochlorothiazide 80-12.5 MG per tablet     DIOVAN-HCT     Take 1 tablet by mouth        Vitamin B 12 100 MCG Lozg      Take 1 tablet by mouth        Vitamin C 500 MG Caps      Take 1 tablet by mouth        vitamin D3 2000 UNITS Caps      Take 1,000 Units by mouth

## 2018-03-01 NOTE — Clinical Note
Could you pls call patient to see what position you want her to sleep? I thought L side down OK, but you told her R side down. Dr PEREZ was the surgeon so I was not there. pls call to clarify  Thanks lora

## 2018-03-01 NOTE — NURSING NOTE
Chief Complaints and History of Present Illnesses   Patient presents with     Post Op (Ophthalmology) Right Eye     1 day post op RE     HPI    Affected eye(s):  Right   Symptoms:           Do you have eye pain now?:  Yes   Location:  OD   Pain Level:  Mild Pain (2)   Pain Frequency:  Constant   Pain Characteristics:  Aching      Comments:  1 day post op RE  Had bad night, unable to sleep well  Charisse HERNANDEZ 10:58 AM March 1, 2018

## 2018-03-02 ENCOUNTER — TELEPHONE (OUTPATIENT)
Dept: OPHTHALMOLOGY | Facility: CLINIC | Age: 83
End: 2018-03-02

## 2018-03-02 NOTE — TELEPHONE ENCOUNTER
7 days face down day and night then rt side after per dr. corrales  Pt verbally demonstrated understanding  Geovanni Cool RN 2:49 PM 03/02/18    Pt schedule next Thursday with dr. Brandan Cool RN 2:52 PM 03/02/18

## 2018-03-02 NOTE — TELEPHONE ENCOUNTER
Positioning quesitons at night-- right or left side.  Will review with dr. corrales and call back  Reviewed 5 minutes apart between drops  Reviewed may use tylenol under 4 grams/day unless told to take less by another provider in past  May use cool compresses gently for swelling and artificial tears between medicated drops    Pt to f/u with dr. Gipson next week  Geovanni Cool RN 1:39 PM 03/02/18

## 2018-03-02 NOTE — TELEPHONE ENCOUNTER
----- Message from Mariama Reza sent at 3/2/2018 11:54 AM CST -----  Regarding: PT CALLING AGAIN REQUESTING CALL BACK REGARDING CONCERNS AND QUESTIONS - DR GAMBOA  Contact: [phone]  Pt called again regarding concerns and questions after retina detachment surgery and is requesting a call back as soon as possible.    Pt can be reached at [phone].    Thank you,    [facility]  Call Center    Please DO NOT send message and or reply back to sender. Call center Representatives DO NOT respond to Messages.

## 2018-03-06 ENCOUNTER — TELEPHONE (OUTPATIENT)
Dept: OPHTHALMOLOGY | Facility: CLINIC | Age: 83
End: 2018-03-06

## 2018-03-06 NOTE — TELEPHONE ENCOUNTER
Right eye retina detachment surgery last week with scleral buckle (february 28th)    Pt spoke to resident on call about bleeding in right eye post-op    Pt calling again today  Bleeding comes and goes-- most often pinkish tinged, but sometimes darker    Does not run down face-- pt dabs and noticed  More consist nt when opens eye for drops  Eyelid still quite swollen    No eye pain  Feels twinges in eye  Generalized headaches  Vision about the same-- gas bubble in eye    Pt has been unable to lay face down at night-- been lying on right side    Will review with dr. corrales in clinic today and call back  Geovanni Cool RN 12:36 PM 03/06/18

## 2018-03-06 NOTE — TELEPHONE ENCOUNTER
Pt may monitor and f/u with Dr. Gipson on Thursday in [place]  If has new eye pain to call (no new eye pain)    Reviewed by dr. corrales    Pt comfortable with information and plan of care  Geovanni Cool RN 2:56 PM 03/06/18

## 2018-03-06 NOTE — TELEPHONE ENCOUNTER
----- Message from Mariamabethanie Reza sent at 3/6/2018 11:23 AM CST -----  Regarding: PT HAS HAD BLEEDING FROM EYE SINCE SURGERY - DR BLACKWOOD  Contact: [phone]  Pt has had bleeding from eye after retina detachment surgery and wondering if that is normal. No pain, just twitches from time to time and wanted to speak with a nurse about this to see if this is normal.    Please give Pt a call back at [phone].    Thank you,    [facility]  Call Center    Please DO NOT send message and or reply back to sender. Call center Representatives DO NOT respond to Messages.